# Patient Record
Sex: MALE | Race: WHITE | URBAN - METROPOLITAN AREA
[De-identification: names, ages, dates, MRNs, and addresses within clinical notes are randomized per-mention and may not be internally consistent; named-entity substitution may affect disease eponyms.]

---
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
## 2025-05-31 NOTE — PROCEDURE: BIOPSY BY SHAVE METHOD
Dressing: no dressing applied Substance induced mood disorder   F19.94  Personality disorder   F60.9  History of alcohol use   Z87.898  Cocaine use disorder   F14.10   Malingering   Z76.5  Substance induced mood disorder   F19.94  Personality disorder   F60.9  History of alcohol use   Z87.898  Cocaine use disorder   F14.10   Malingering   Z76.5  Substance induced mood disorder   F19.94  Personality disorder   F60.9  History of alcohol use   Z87.898  Cocaine use disorder   F14.10   Malingering   Z76.5  Substance induced mood disorder   F19.94  Personality disorder   F60.9  History of alcohol use   Z87.898  Cocaine use disorder   F14.10